# Patient Record
Sex: FEMALE | Race: WHITE | NOT HISPANIC OR LATINO | URBAN - METROPOLITAN AREA
[De-identification: names, ages, dates, MRNs, and addresses within clinical notes are randomized per-mention and may not be internally consistent; named-entity substitution may affect disease eponyms.]

---

## 2019-03-21 ENCOUNTER — ON CAMPUS - OUTPATIENT (OUTPATIENT)
Dept: URBAN - METROPOLITAN AREA HOSPITAL 77 | Facility: HOSPITAL | Age: 79
End: 2019-03-21
Payer: COMMERCIAL

## 2019-03-21 DIAGNOSIS — R19.5 OTHER FECAL ABNORMALITIES: ICD-10-CM

## 2019-03-21 DIAGNOSIS — D12.3 BENIGN NEOPLASM OF TRANSVERSE COLON: ICD-10-CM

## 2019-03-21 DIAGNOSIS — Z12.11 ENCOUNTER FOR SCREENING FOR MALIGNANT NEOPLASM OF COLON: ICD-10-CM

## 2019-03-21 DIAGNOSIS — K64.8 OTHER HEMORRHOIDS: ICD-10-CM

## 2019-03-21 PROCEDURE — 45380 COLONOSCOPY AND BIOPSY: CPT | Mod: PT | Performed by: INTERNAL MEDICINE

## 2025-04-23 ENCOUNTER — APPOINTMENT (OUTPATIENT)
Dept: ULTRASOUND IMAGING | Facility: HOSPITAL | Age: 85
End: 2025-04-23
Payer: MEDICARE

## 2025-04-23 ENCOUNTER — HOSPITAL ENCOUNTER (EMERGENCY)
Facility: HOSPITAL | Age: 85
Discharge: HOME | End: 2025-04-23
Attending: EMERGENCY MEDICINE | Admitting: EMERGENCY MEDICINE
Payer: MEDICARE

## 2025-04-23 VITALS
TEMPERATURE: 97.4 F | HEART RATE: 78 BPM | HEIGHT: 64 IN | OXYGEN SATURATION: 96 % | WEIGHT: 168.5 LBS | RESPIRATION RATE: 18 BRPM | SYSTOLIC BLOOD PRESSURE: 146 MMHG | DIASTOLIC BLOOD PRESSURE: 59 MMHG | BODY MASS INDEX: 28.77 KG/M2

## 2025-04-23 DIAGNOSIS — L03.115 CELLULITIS AND ABSCESS OF RIGHT LOWER EXTREMITY: Primary | ICD-10-CM

## 2025-04-23 DIAGNOSIS — L02.415 CELLULITIS AND ABSCESS OF RIGHT LOWER EXTREMITY: Primary | ICD-10-CM

## 2025-04-23 PROCEDURE — 99283 EMERGENCY DEPT VISIT LOW MDM: CPT | Performed by: EMERGENCY MEDICINE

## 2025-04-23 PROCEDURE — 93971 EXTREMITY STUDY: CPT

## 2025-04-23 PROCEDURE — 99284 EMERGENCY DEPT VISIT MOD MDM: CPT

## 2025-04-23 RX ORDER — CLINDAMYCIN HYDROCHLORIDE 150 MG/1
300 CAPSULE ORAL 3 TIMES DAILY
Qty: 60 CAPSULE | Refills: 0 | Status: SHIPPED | OUTPATIENT
Start: 2025-04-23 | End: 2025-05-03

## 2025-04-23 RX ORDER — L.ACID,PARA/B.BIFIDUM/S.THERM 8B CELL
1 CAPSULE ORAL DAILY
Qty: 14 CAPSULE | Refills: 0 | Status: SHIPPED | OUTPATIENT
Start: 2025-04-23 | End: 2025-05-07

## 2025-04-23 NOTE — DISCHARGE INSTRUCTIONS
Continue taking your Xarelto as prescribed.  Take antibiotics as prescribed.  Follow-up with your doctor next available appointment.

## 2025-04-23 NOTE — FSED PROVIDER NOTE
Subjective   History of Present Illness  84-year-old female presents complaining of leg swelling on the right.  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  She used to be on Eliquis but was switched to Xarelto last year.  She was confused because when she got her Xarelto refilled it read that Eliquis was to be discontinued.  This made her think she was to not take the medication so she has been taking it for the last week and a half.  Her granddaughter is with her who says that she spoke with her physician who said she should still be taking the medication.  Leg began to swell over the last week.  Review of Systems  As noted in HPI  Past Medical History:   Diagnosis Date    Hyperlipidemia     Hypertension        Allergies   Allergen Reactions    Meloxicam Shortness Of Breath     Other reaction(s): Pruritic rash    Pravastatin Other (See Comments)     Severe muscle cramping even on CoQ10    Penicillins Swelling     Other reaction(s): Unknown      Sulfa Antibiotics Swelling    Cephalexin Rash and Swelling    Nitrofurantoin Rash    Phenobarbital Rash       Past Surgical History:   Procedure Laterality Date    KNEE ARTHROPLASTY Bilateral        History reviewed. No pertinent family history.    Social History     Socioeconomic History    Marital status:    Tobacco Use    Smoking status: Never    Smokeless tobacco: Never   Vaping Use    Vaping status: Never Used   Substance and Sexual Activity    Alcohol use: Never    Drug use: Never    Sexual activity: Defer           Objective   Physical Exam  Nursing notes reviewed.  INITIAL VITAL SIGNS: Reviewed by me.  Pulse ox normal  GENERAL: Alert. Well developed and well nourished. No respiratory distress.  HEAD: Normocephalic.   EYES: No conjunctival injection.  ENT: Oral mucosa is moist.  NECK/BACK: Supple. Full range of motion.  RESPIRATORY: Non-labored respirations.  EXTREMITIES: No deformity.  There is some slight induration with redness and warmth  to the right lower extremity with approximately 2+ pitting edema to the proximal lower leg, on the left side there is only about 1+.  No redness warmth or induration on the left.  SKIN: Warm and dry. No rashes. No diaphoresis.  NEUROLOGIC: Alert. Normal gait    Procedures           ED Course  ED Course as of 04/23/25 1726 Wed Apr 23, 2025 1723 DVT scan is negative, will send home with some antibiotics for cellulitis.  Regarding the cough mention to triage I discussed with patient daughter they said she had some cough last week it is getting better, offered chest x-ray but they declined. [RO]      ED Course User Index  [RO] Hay Jain MD                                           Medical Decision Making  Problems Addressed:  Cellulitis and abscess of right lower extremity: complicated acute illness or injury    Amount and/or Complexity of Data Reviewed  Radiology:  Decision-making details documented in ED Course.    Risk  OTC drugs.  Prescription drug management.        Final diagnoses:   Cellulitis and abscess of right lower extremity       ED Disposition  ED Disposition       ED Disposition   Discharge    Condition   Good    Comment   --               Tarah Rashid L, APRN [address] [facility] [place] [address] [phone]    Call   To schedule follow-up appointment         Medication List        New Prescriptions      clindamycin 150 MG capsule  Commonly known as: CLEOCIN  Take 2 capsules by mouth 3 (Three) Times a Day for 10 days.     lactobacillus acidophilus capsule capsule  Take 1 capsule by mouth Daily for 14 days.               Where to Get Your Medications        These medications were sent to [facility] [id number] - [place], [place] - [address] [place] [address] - [phone] [facility] [phone] [address] [place] [address]      Phone: [phone]   clindamycin 150 MG capsule  lactobacillus acidophilus capsule capsule